# Patient Record
Sex: FEMALE | Race: ASIAN | Employment: STUDENT | URBAN - METROPOLITAN AREA
[De-identification: names, ages, dates, MRNs, and addresses within clinical notes are randomized per-mention and may not be internally consistent; named-entity substitution may affect disease eponyms.]

---
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
## 2019-05-15 NOTE — TELEPHONE ENCOUNTER
Called [facility] to jake back to see if they can use [facility] lab I put this in as preferred lab but need to make sure

## 2019-05-15 NOTE — TELEPHONE ENCOUNTER
Talked to mother gave her information on labs and need to fast then she asked to notify daughter so I called her and left message in detail on labs and need to fast.

## 2019-05-24 NOTE — PROGRESS NOTES
:HPI:   Ya Holloway is a 22year old female who presents for a complete physical exam.     Patient complains of lump on right wrist.  Not painful. Mom concerned about it. Patient thinks it was noticeable after she lost a lot of weight.     Wt Readings f 56   Temp 98.5 °F (36.9 °C) (Oral)   Resp 16   Ht 64.5\"   Wt 140 lb   LMP 04/24/2019   BMI 23.66 kg/m²   Body mass index is 23.66 kg/m². GENERAL: well developed, well nourished,in no apparent distress.   Mood, affect and behavior is normal.    SKIN: no r 05/17/2019 09:02 AM    LDL 76 05/17/2019 09:02 AM    NONHDLC 87 05/17/2019 09:02 AM          Thyroid  (most recent labs)   Lab Results   Component Value Date/Time    T4F 1.1 01/19/2015 12:12 PM         Lipids  (most recent labs)   No results found for: A1C

## 2019-06-28 NOTE — ED PROVIDER NOTES
Patient Seen in: [address] [facility]    History   Patient presents with:  Cough/URI    Stated Complaint: sore throat nasal congestion x 3 days     HPI    22year old female presents for sore throat, sinus congestion and cough.  States sym Pulmonary/Chest: Effort normal and breath sounds normal.   Abdominal: Soft. She exhibits no distension. There is no tenderness. Neurological: She is alert and oriented to person, place, and time. Skin: Skin is warm and dry.    Psychiatric: She has a n

## 2019-12-20 NOTE — PROGRESS NOTES
Patient presents with:  Cough: last week thursday: vitamin c   Runny Nose: last week thursday       HISTORY OF PRESENT ILLNESS  Dyan La is a 20year old female who presents for evaluation of nasal congestion and cough.  For the last 8 days, she has be persistent or worsening symptoms, fevers, chills, shortness of breath. Patient expresses understanding and agreement with above plan.   Robyn Clark PA-C

## 2019-12-27 NOTE — PATIENT INSTRUCTIONS
Gargle with warm salt water solution 3-5 times daily. Dissolve 1/2 teaspoon salt in half cup of warm tap water. Gargle and spit.      Try a premixed saline nasal spray, available over the counter, such as Hamblen Nasal Spray (or generic equi

## 2019-12-27 NOTE — PROGRESS NOTES
Patient presents with:  Cough: same symptoms she had on 12/20 nothing has changed       HPI:  Presents with 15 day history of cough with production of yellow/green colored sputum, sinus congestion, nasal drainage and very mild fatigue.  Denies fever/chills, Do routine nasal saline sinus rinses, warm salt water gargles, as per patient instructions, along with supportive care-increased fluids/rest. Instructed to notify office if not improved in 3-4 days or if symptoms worsen.  Verbalized understanding of instruc

## 2020-04-02 ENCOUNTER — TELEPHONE (OUTPATIENT)
Dept: FAMILY MEDICINE CLINIC | Facility: CLINIC | Age: 20
End: 2020-04-02

## 2020-04-02 NOTE — TELEPHONE ENCOUNTER
Patient called requesting an appointment with Andres Hernández, she needs a medical clearence form completed for her employer.

## 2020-04-02 NOTE — TELEPHONE ENCOUNTER
Unfortunately she has not been seen for a physical since May 2019 and will need updated physical. Can we/ have patient call [employer] and figure out when they are trying to get the fitness testing going? Just given pandemic I would assume that they are in same boat with limiting what is being done. Would prefer to not schedule physical until May and we can sign off on form then.      Thanks,  Aster Ball

## 2020-04-02 NOTE — TELEPHONE ENCOUNTER
Please find out what information is needed on the form. We are not scheduling physicals. Employers should be understanding of this.

## 2020-04-02 NOTE — TELEPHONE ENCOUNTER
Called patient and she will fax the form to us, It just has a check off that she is able to do the physical fitness course conducted by the [employer]

## 2020-04-02 NOTE — TELEPHONE ENCOUNTER
Has form that she needs medical clearance form for [employer] filled out needs to be done as soon as possible

## 2021-06-10 NOTE — TELEPHONE ENCOUNTER
Pt asking if she can get wart burned of instead of using medication - would ins cover it and what is recovery time

## 2021-06-10 NOTE — TELEPHONE ENCOUNTER
I do not have a Hyfrecator in [place] so therefore the patient should be scheduled at the [address] [place] [address] office for this about 1/2-hour procedure thank you

## 2021-06-10 NOTE — TELEPHONE ENCOUNTER
Dr. Phillip Lacks please see below. Patient was seen today in the clinic. Calling back to inquire about other treatment options. Would this be possible for her? If so can you please advise on procedure details? Thank you.

## 2021-06-10 NOTE — PROGRESS NOTES
Prescription called into [facility] [address] for pt per verbal order from Dr Annette Holt. Prescription for Wart Peel: 53% Salicylic Acid with 2% 5-fluorouricel. One tube, one refil. *apply after bathing daily and cover with duct tape*.  Msg left on voice m

## 2021-06-11 NOTE — TELEPHONE ENCOUNTER
Pt is returning the nurses call. Pt. States that she wants to change the dr's treatment plan. Please call to discuss.

## 2021-06-11 NOTE — PROGRESS NOTES
Nino Medina is a 23year old female. Patient presents with: Foot Pain: Right foot pain. Mass on bottom of foot. Onset 1 month ago. Pain with pressure. Pain scale 4-5/10.   Diabetic Ulcer        HPI:   This pleasant patient presents to the clinic she is c visit.  GENERAL: well developed, well nourished, in no apparent distress  EXTREMITIES:   1. Integument: The skin on her right foot was evaluated is warm and moist she has a plantar dermal papillomatous lesion in the vicinity of the second metatarsal head.

## 2021-06-11 NOTE — TELEPHONE ENCOUNTER
I spoke with the patient. Discussed procedure for electrocautery of wart. Answered her questions. She would like to discuss with her parents and will call back to schedule if she wishes to book appt in [place] for procedure.    Advised to discuss cove

## 2021-07-11 NOTE — PROGRESS NOTES
Kailey Washington is a 23year old female. Patient presents with:  Warts: Right foot f/u - states she did not noticed any changes, no pain         HPI:   Patient returns to the clinic she has not noticed any pain or discomfort on the bottom of her right foot. debridement of verruca seems to be resolved at this time   2. Vascular: The patient has palpable pulses no changes here   3. Neurologic: There are no changes still has intact sensorium she does not complain of any numbness or tingling   4.  Musculoskeletal:

## 2023-03-24 NOTE — TELEPHONE ENCOUNTER
Patient calling to transfer OB care due to currently in [facility]/ [employer], moving back to [place] to deliver. GA 16 wks  HILARY Sept 2023  Vanesa ADLERO    Mother called for pt, she will have what records she has already translated, faxed to [facility] office.  Other records are still processing for translating

## 2023-05-19 PROBLEM — H61.23 BILATERAL IMPACTED CERUMEN: Status: RESOLVED | Noted: 2017-08-07 | Resolved: 2023-05-19

## 2023-05-19 PROBLEM — Z34.00 SUPERVISION OF NORMAL FIRST PREGNANCY, ANTEPARTUM: Status: ACTIVE | Noted: 2023-05-19

## 2023-05-19 NOTE — PROGRESS NOTES
AYAZ--24w2d    Doing well. Denies Vb, LOF, contractions. positive fetal movement. A/P:   1. FHT-P  2. PNL: was confused on what needed to be done. Ordered 1 hour with remainder of labs, stressed should have done prior to next visit  3. RH unknown, await labs  4. Reviewed TDAP, she will consider  5. We reviewed options of IOL at 39+ weeks--she will consider. We reviewed logistics and advised if she wants to schedule we should try to do ASAP  6.  Reviewed birth classes, peds picking    Return in 4 weeks

## 2023-05-19 NOTE — PATIENT INSTRUCTIONS
Please call [phone] to set up an appointment for your laboratory draw. SCREENING FOR GESTATIONAL DIABETES     Pregnancy is a stress to your body. One way in which the stress may manifest itself is through an abnormality in sugar metabolism. In non-pregnant patients, this abnormality (a lack of insulin) is known as diabetes. During pregnancy, the insulin normally secreted may become ineffective because of the various hormones and enzymes produced by the placenta. This leads to a condition known as gestational diabetes, Class A diabetes, or pregnancy induced glucose intolerance (PIGI). This condition disappears after delivery. It is known to occur in approximately 5-10% of all pregnancies. It is important because we know that increased maternal blood glucose (sugar) levels can cause fetal problems. That is, the morbidity and mortality rates can be doubled among pregnancies complicated by undiagnosed gestational diabetes. We screen all patients for gestational diabetes at around 24 weeks of pregnancy. The reason we wait until this time is that the stresses of pregnancy leading to diabetes become maximal around this time, therefore, the chance of a false negative test is minimized. If the diabetes is detected at this time, it gives us ample time to correct the glucose metabolism and hopefully, decrease  morbidity and mortality. An order will be placed and the screening will be done in an outpatient lab facility. Please call the lab to schedule an appointment for this testing, the outpatient labs do not take walk-ins due to the length of this test.  On the day of your test, we suggest you eat a normal breakfast, but eliminate pastries/doughnuts the morning of your test.  We also recommend that you do not eat or drink anything with sugar in it 2 hours prior to your test.   Upon arrival at the outpatient laboratory facility, you will be given a 50 gm carbohydrate drink.   A blood glucose level will then be drawn one hour after ingestion. If the blood glucose value is normal and no other risk factors exist, no further testing is necessary. If the blood glucose is abnormal, this indicates an increased risk of gestational diabetes and will require a formal three hour glucose tolerance test.  This test will be done within two weeks following your abnormal one hour screen. Approximately 15% of patients with positive screening tests will have an abnormal three hour test glucose tolerance test.     If you have any questions regarding this testing, please feel free to contact us. If gestational diabetes is detected, it will be thoroughly explained to you and discussion on management will take place with one of our physicians. *If your preferred Lab is THE [facility], you may choose either the [place] location or the [place] location, [address] for your testing. You are also welcome to coordinate your lab appointment with your 24 week obstetric appointment so that you may spend your one hour wait time in our office rather than the lab facility [address] [place] [address] has had significant outbreaks of pertussis (whooping cough) for the last few years. Therefore, the CDC recommends that all pregnant women receive a Tdap vaccine during pregnancy, regardless of whether you have received one previously. The vaccine reduces your chances of marty the illness, and also provides some natural immunity to your baby for possible exposures after birth. The best time to get the vaccine is between 27 and 36 weeks. Baby caregivers (grandparents, spouse) should also receive the vaccine. Influenza- Pregnant women who develop the flu are more prone to serious complications that can affect both mother and baby. The CDC recommends that all women who will be pregnant during flu season (October to May) receive the flu vaccine (injection only, not nasal spray).   The vaccine can be given during any stage of pregnancy. Both vaccines are offered in our office, as well as through many pharmacies and primary care offices. Pregnancy/Nursing and Covid-19 Vaccine   As the [facility] begins to make progress in administering the Covid-19 vaccine, we understand that our pregnant and breastfeeding patients will have questions about the safety of the Covid-19 vaccine. Keep in mind that information is evolving rapidly and that recommendations can change over time. The CDC, the Society for Maternal Fetal Medicine, and the [facility] of Obstetrics & Gynecology now recommend that all pregnant and breastfeeding women consider getting the Covid-19 vaccine, in consultation with their healthcare provider. Factors to consider include the available safety data, risks to pregnant women from Covid-19 infection, and your individual risk for infection and severe disease, based on your risk of exposure and any other medical risk factors that you may have. A recent study of 36,000 pregnant women confirmed the safety and effectiveness of the vaccine, with no increase risk of miscarriage. Here are some facts to consider when you are making a decision:   [1] Pregnant women are at higher risk for acquiring Covid-19 infection and have a subsequent higher risk for the following:  - severe illness  - adverse pregnancy outcomes including  birth  - hospitalization, ICU admission, need for mechanical ventilation and death  [2] The mRNA vaccines that are currently available do not contain live virus, do not enter the nucleus, do not cause genetic changes, do not alter human DNA, and cannot cause Covid-19 illness. [3] mRNA breaks down quickly, so it's unlikely that any of the mRNA in the vaccine would be able to get into your breast milk or into your baby through the placenta.   [4] The antibodies that your body produces in response to the vaccine will be passed to your baby through the placenta and provide some protection for your  baby against Covid-19 infection. [5] Whether you ultimately decide to receive the vaccine or not, do not forget to continue with other preventative measures including frequent hand washing, avoiding crowds, physical distancing and wearing a mask.

## 2023-06-07 PROBLEM — O99.810 ABNORMAL GLUCOSE TOLERANCE TEST IN PREGNANCY, ANTEPARTUM: Status: ACTIVE | Noted: 2023-06-07

## 2023-06-07 NOTE — PROGRESS NOTES
Abnormal 1 hr glucose tolerance test.  Check 3 hr GTT. Order placed. Patient notified via [address]

## 2023-06-17 ENCOUNTER — APPOINTMENT (OUTPATIENT)
Dept: ULTRASOUND IMAGING | Facility: HOSPITAL | Age: 23
End: 2023-06-17
Attending: OBSTETRICS & GYNECOLOGY
Payer: COMMERCIAL

## 2023-06-17 ENCOUNTER — HOSPITAL ENCOUNTER (OUTPATIENT)
Facility: HOSPITAL | Age: 23
Discharge: HOME | End: 2023-06-17
Attending: OBSTETRICS & GYNECOLOGY | Admitting: OBSTETRICS & GYNECOLOGY
Payer: COMMERCIAL

## 2023-06-17 ENCOUNTER — ROUTINE PRENATAL (OUTPATIENT)
Dept: OBGYN CLINIC | Facility: CLINIC | Age: 23
End: 2023-06-17
Payer: COMMERCIAL

## 2023-06-17 VITALS
DIASTOLIC BLOOD PRESSURE: 73 MMHG | BODY MASS INDEX: 31.24 KG/M2 | HEART RATE: 87 BPM | SYSTOLIC BLOOD PRESSURE: 123 MMHG | HEIGHT: 64 IN | WEIGHT: 183 LBS

## 2023-06-17 VITALS
DIASTOLIC BLOOD PRESSURE: 66 MMHG | SYSTOLIC BLOOD PRESSURE: 102 MMHG | BODY MASS INDEX: 31 KG/M2 | WEIGHT: 183.38 LBS | HEART RATE: 79 BPM

## 2023-06-17 DIAGNOSIS — Z23 NEED FOR VACCINATION: ICD-10-CM

## 2023-06-17 DIAGNOSIS — Z34.00 SUPERVISION OF NORMAL FIRST PREGNANCY, ANTEPARTUM: Primary | ICD-10-CM

## 2023-06-17 DIAGNOSIS — O36.8190 DECREASED FETAL MOVEMENT AFFECTING MANAGEMENT OF PREGNANCY, ANTEPARTUM, SINGLE OR UNSPECIFIED FETUS: ICD-10-CM

## 2023-06-17 DIAGNOSIS — Z3A.28 28 WEEKS GESTATION OF PREGNANCY: ICD-10-CM

## 2023-06-17 LAB
GLUCOSE (URINE DIPSTICK): NEGATIVE MG/DL
MULTISTIX LOT#: NORMAL NUMERIC
PROTEIN (URINE DIPSTICK): NEGATIVE MG/DL

## 2023-06-17 PROCEDURE — 90471 IMMUNIZATION ADMIN: CPT | Performed by: OBSTETRICS & GYNECOLOGY

## 2023-06-17 PROCEDURE — 76818 FETAL BIOPHYS PROFILE W/NST: CPT | Performed by: OBSTETRICS & GYNECOLOGY

## 2023-06-17 PROCEDURE — 76819 FETAL BIOPHYS PROFIL W/O NST: CPT | Performed by: OBSTETRICS & GYNECOLOGY

## 2023-06-17 PROCEDURE — 3078F DIAST BP <80 MM HG: CPT | Performed by: OBSTETRICS & GYNECOLOGY

## 2023-06-17 PROCEDURE — 3074F SYST BP LT 130 MM HG: CPT | Performed by: OBSTETRICS & GYNECOLOGY

## 2023-06-17 PROCEDURE — 59025 FETAL NON-STRESS TEST: CPT | Performed by: OBSTETRICS & GYNECOLOGY

## 2023-06-17 PROCEDURE — 81002 URINALYSIS NONAUTO W/O SCOPE: CPT | Performed by: OBSTETRICS & GYNECOLOGY

## 2023-06-17 PROCEDURE — 90715 TDAP VACCINE 7 YRS/> IM: CPT | Performed by: OBSTETRICS & GYNECOLOGY

## 2023-06-17 NOTE — NST
Nonstress Test   Patient: Jeremiah Hudson    Gestation: 28w3d    NST:       Variability: Moderate           Accelerations: Yes           Decelerations: None            Baseline: 140 BPM           Uterine Irritability: No           Contractions: Not present                                        Acoustic Stimulator: No           Nonstress Test Interpretation: Appropriate for gestational age           Nonstress Test Second Interpretation: Appropriate for gestational age                     Additional Comments

## 2023-06-17 NOTE — PROGRESS NOTES
AYAZ    GA: 28w3d   23  1126   BP: 102/66   Pulse: 79   Weight: 183 lb 6 oz (83.2 kg)       Doing well, +FM but decreased. The patient reports that she has noted less kicks but slight fluttering of movement since she completed her 3-hour glucose tolerance test.  Denies LOF/VB/uctx  Rh +, TDAP received, EPDS 0  PTL and Fetal movement instructions given  Decreased fetal movement, NST in office noted for moderate ability but nonreactive. Sent to labor and delivery for further evaluation including BPP with prolonged monitoring. Triage RN notified. On-call physician notified. Precautions provided. Of note, patient's partner is in the [employer]. Therefore, patient desires to be scheduled for induction of labor. Patient desires to be scheduled on 2023 for cervical ripening. Request sent. Problem List Items Addressed This Visit        Gravid and     Supervision of normal first pregnancy, antepartum - Primary   Other Visit Diagnoses     28 weeks gestation of pregnancy        Relevant Orders    URINALYSIS NONAUTO W/O SCOPE (OB URISTIX) (Completed)    Need for vaccination        Relevant Orders    IMMUNIZATION ADMINISTRATION    TETANUS, DIPHTHERIA TOXOIDS AND ACELLULAR PERTUSIS VACCINE (TDAP), >7 YEARS, IM USE (Completed)    Decreased fetal movement affecting management of pregnancy, antepartum, single or unspecified fetus        Relevant Orders    FETAL NON-STRESS TEST EMG ONLY [address]          RTC in 2 wks      Note to patient and family   The [facility] makes medical notes available to patients in the interest of transparency. However, please be advised that this is a medical document. It is intended as jdov-li-xckc communication. It is written and medical language may contain abbreviations or verbiage that are technical and unfamiliar. It may appear blunt or direct.   Medical documents are intended to carry relevant information, facts as evident, and the clinical opinion of the practitioner.              '

## 2023-06-17 NOTE — PROGRESS NOTES
Pt is a 20year old female admitted to TRG2/TRG2-A. Pt is  28w3d intra-uterine pregnancy. Patient presents with:  Decreased Fetal Movement  Pt sent from OB office d/t decreased fetal movement and NR NST. Pt denies any complications w/ current pregnancy or any chronic medical hx. Pt denies any leaking of fluid, vaginal bleeding, or uterine activity. History obtained, oriented to room, staff, and plan of care.

## 2023-06-18 PROCEDURE — 59025 FETAL NON-STRESS TEST: CPT | Performed by: OBSTETRICS & GYNECOLOGY

## 2023-06-18 NOTE — PROGRESS NOTES
FETAL NONSTRESS TEST:  Baseline FHR: 130 per minute  Fetal heart variability: moderate  Fetal Heart Rate accelerations: 15x15   Fetal Heart Rate decelerations: one possible variable deceleration  Tracing category 1  Fetal Non-stress Test Interpretation: reactive    Colette Levy MD  [address] [facility]

## 2023-06-19 ENCOUNTER — TELEPHONE (OUTPATIENT)
Dept: OBGYN CLINIC | Facility: CLINIC | Age: 23
End: 2023-06-19

## 2023-07-01 ENCOUNTER — ROUTINE PRENATAL (OUTPATIENT)
Dept: OBGYN CLINIC | Facility: CLINIC | Age: 23
End: 2023-07-01
Payer: COMMERCIAL

## 2023-07-01 VITALS
DIASTOLIC BLOOD PRESSURE: 72 MMHG | HEART RATE: 73 BPM | SYSTOLIC BLOOD PRESSURE: 104 MMHG | BODY MASS INDEX: 32 KG/M2 | WEIGHT: 184.5 LBS

## 2023-07-01 DIAGNOSIS — Z34.00 SUPERVISION OF NORMAL FIRST PREGNANCY, ANTEPARTUM: Primary | ICD-10-CM

## 2023-07-01 LAB
GLUCOSE (URINE DIPSTICK): NEGATIVE MG/DL
MULTISTIX LOT#: ABNORMAL NUMERIC

## 2023-07-01 PROCEDURE — 81002 URINALYSIS NONAUTO W/O SCOPE: CPT | Performed by: NURSE PRACTITIONER

## 2023-07-01 PROCEDURE — 3074F SYST BP LT 130 MM HG: CPT | Performed by: NURSE PRACTITIONER

## 2023-07-01 PROCEDURE — 3078F DIAST BP <80 MM HG: CPT | Performed by: NURSE PRACTITIONER

## 2023-07-14 ENCOUNTER — ROUTINE PRENATAL (OUTPATIENT)
Dept: OBGYN CLINIC | Facility: CLINIC | Age: 23
End: 2023-07-14
Payer: COMMERCIAL

## 2023-07-14 VITALS
WEIGHT: 187.25 LBS | SYSTOLIC BLOOD PRESSURE: 106 MMHG | DIASTOLIC BLOOD PRESSURE: 70 MMHG | BODY MASS INDEX: 32 KG/M2 | HEART RATE: 110 BPM

## 2023-07-14 DIAGNOSIS — Z3A.32 32 WEEKS GESTATION OF PREGNANCY: Primary | ICD-10-CM

## 2023-07-14 LAB
GLUCOSE (URINE DIPSTICK): NEGATIVE MG/DL
MULTISTIX LOT#: ABNORMAL NUMERIC
PROTEIN (URINE DIPSTICK): 30 MG/DL

## 2023-07-14 PROCEDURE — 3074F SYST BP LT 130 MM HG: CPT | Performed by: OBSTETRICS & GYNECOLOGY

## 2023-07-14 PROCEDURE — 81002 URINALYSIS NONAUTO W/O SCOPE: CPT | Performed by: OBSTETRICS & GYNECOLOGY

## 2023-07-14 PROCEDURE — 3078F DIAST BP <80 MM HG: CPT | Performed by: OBSTETRICS & GYNECOLOGY

## 2023-07-14 NOTE — PROGRESS NOTES
32w2d seen for routine OB visit. Denies ctx, lof, vb. Reports good FM. Patient Active Problem List:     Supervision of normal first pregnancy, antepartum     Abnormal glucose tolerance test in pregnancy, antepartum       TW lb 4 oz (16.9 kg)   Depression Scale Total: 0 (2023  9:11 AM)      Gen: AAOx3, NAD  Resp: breathing comfortably  Abdomen: gravid, soft, nontender  Ext: nontender, no edema    Plan:  - s/p tdap  - 3T HIV neg  - questions about pediatrician, cord blood banking, prenatal massage  - return precautions reviewed    RTO in 2 week(s).

## 2023-07-23 NOTE — TELEPHONE ENCOUNTER
Please enter lab orders for the patient's upcoming physical appointment. Physical scheduled:    Your appointments     Date & Time Appointment Department [facility])    May 24, 2019 10:00 AM CDT Physical - Established Patient with AI Sheridan No

## 2023-07-29 ENCOUNTER — ROUTINE PRENATAL (OUTPATIENT)
Dept: OBGYN CLINIC | Facility: CLINIC | Age: 23
End: 2023-07-29
Payer: COMMERCIAL

## 2023-07-29 VITALS
WEIGHT: 195.19 LBS | HEIGHT: 64 IN | SYSTOLIC BLOOD PRESSURE: 110 MMHG | DIASTOLIC BLOOD PRESSURE: 62 MMHG | BODY MASS INDEX: 33.32 KG/M2

## 2023-07-29 DIAGNOSIS — Z34.00 SUPERVISION OF NORMAL FIRST PREGNANCY, ANTEPARTUM: Primary | ICD-10-CM

## 2023-07-29 DIAGNOSIS — O26.843 UTERINE SIZE-DATE DISCREPANCY IN THIRD TRIMESTER: ICD-10-CM

## 2023-07-29 DIAGNOSIS — O26.00 EXCESSIVE WEIGHT GAIN AFFECTING PREGNANCY: ICD-10-CM

## 2023-07-29 PROBLEM — O26.849 UTERINE SIZE DATE DISCREPANCY: Status: ACTIVE | Noted: 2023-07-29

## 2023-07-29 PROBLEM — O36.8130 DECREASED FETAL MOVEMENTS IN THIRD TRIMESTER: Status: ACTIVE | Noted: 2023-07-29

## 2023-07-29 LAB
GLUCOSE (URINE DIPSTICK): NEGATIVE MG/DL
MULTISTIX LOT#: NORMAL NUMERIC

## 2023-07-29 PROCEDURE — 81002 URINALYSIS NONAUTO W/O SCOPE: CPT | Performed by: STUDENT IN AN ORGANIZED HEALTH CARE EDUCATION/TRAINING PROGRAM

## 2023-07-29 PROCEDURE — 3008F BODY MASS INDEX DOCD: CPT | Performed by: STUDENT IN AN ORGANIZED HEALTH CARE EDUCATION/TRAINING PROGRAM

## 2023-07-29 PROCEDURE — 3078F DIAST BP <80 MM HG: CPT | Performed by: STUDENT IN AN ORGANIZED HEALTH CARE EDUCATION/TRAINING PROGRAM

## 2023-07-29 PROCEDURE — 3074F SYST BP LT 130 MM HG: CPT | Performed by: STUDENT IN AN ORGANIZED HEALTH CARE EDUCATION/TRAINING PROGRAM

## 2023-07-29 NOTE — PROGRESS NOTES
Return OB Visit 28-33 WGA      GA: 34w3d   23  1133   BP: 110/62   Weight: 195 lb 3.2 oz (88.5 kg)   Height: 64\"       Doing well. Pt reports minimal fetal movement. She will feel baby move once or twice a day. She \"does not do kick counts because baby never moves enough\". Denies LOF/VB/uctx  Rh positive, TDAP 2023 received, EPDS Depression Scale Total: 0 (2023  9:11 AM)   PTL and Fetal movement instructions given  3rd T HIV NR      Patient Active Problem List    Decreased fetal movements in third trimester            NSTs weekly unless resolves      Excessive weight gain affecting pregnancy      Uterine size date discrepancy            Growth ultrasound ordered       Abnormal glucose tolerance test in pregnancy, antepartum            [ x ] check 3 hr GTT- Nl 0/4      Supervision of normal first pregnancy, antepartum        RTC in 2 wks      Note to patient and family   The [facility] makes medical notes available to patients in the interest of transparency. However, please be advised that this is a medical document. It is intended as gjqy-pr-turb communication. It is written and medical language may contain abbreviations or verbiage that are technical and unfamiliar. It may appear blunt or direct. Medical documents are intended to carry relevant information, facts as evident, and the clinical opinion of the practitioner.       Erika Chapin MD

## 2023-07-31 ENCOUNTER — ROUTINE PRENATAL (OUTPATIENT)
Dept: OBGYN CLINIC | Facility: CLINIC | Age: 23
End: 2023-07-31
Payer: COMMERCIAL

## 2023-07-31 ENCOUNTER — APPOINTMENT (OUTPATIENT)
Dept: OBGYN CLINIC | Facility: CLINIC | Age: 23
End: 2023-07-31
Payer: COMMERCIAL

## 2023-07-31 ENCOUNTER — TELEPHONE (OUTPATIENT)
Dept: OBGYN CLINIC | Facility: CLINIC | Age: 23
End: 2023-07-31

## 2023-07-31 VITALS
SYSTOLIC BLOOD PRESSURE: 110 MMHG | DIASTOLIC BLOOD PRESSURE: 72 MMHG | WEIGHT: 195.25 LBS | HEART RATE: 106 BPM | BODY MASS INDEX: 34 KG/M2

## 2023-07-31 DIAGNOSIS — O36.8130 DECREASED FETAL MOVEMENTS IN THIRD TRIMESTER, SINGLE OR UNSPECIFIED FETUS: ICD-10-CM

## 2023-07-31 DIAGNOSIS — Z34.00 SUPERVISION OF NORMAL FIRST PREGNANCY, ANTEPARTUM: ICD-10-CM

## 2023-07-31 DIAGNOSIS — Z3A.34 34 WEEKS GESTATION OF PREGNANCY: Primary | ICD-10-CM

## 2023-07-31 LAB
GLUCOSE (URINE DIPSTICK): NEGATIVE MG/DL
MULTISTIX LOT#: NORMAL NUMERIC

## 2023-07-31 PROCEDURE — 3074F SYST BP LT 130 MM HG: CPT | Performed by: NURSE PRACTITIONER

## 2023-07-31 PROCEDURE — 81002 URINALYSIS NONAUTO W/O SCOPE: CPT | Performed by: NURSE PRACTITIONER

## 2023-07-31 PROCEDURE — 3078F DIAST BP <80 MM HG: CPT | Performed by: NURSE PRACTITIONER

## 2023-07-31 PROCEDURE — 59025 FETAL NON-STRESS TEST: CPT | Performed by: NURSE PRACTITIONER

## 2023-07-31 NOTE — PROGRESS NOTES
AYAZ  Doing well, no concerns  GOOD FM  Denies VB/LOF/uctx  RTC in 1 week with NST for chronic decreased fetal movement  NST was reactive  Fetal movement discussed

## 2023-07-31 NOTE — TELEPHONE ENCOUNTER
----- Message from Erika Chapin MD sent at 7/29/2023  1:06 PM CDT -----  Hi,     I am concerned about this patient because she says her baby only moves once or twice a day. She is 34w3d. I discussed weekly nsts. However her first is not until 8/9. Can we fit her in next week?     Thanks,     Erika Chapin MD

## 2023-08-01 ENCOUNTER — HOSPITAL ENCOUNTER (OUTPATIENT)
Dept: ULTRASOUND IMAGING | Facility: HOSPITAL | Age: 23
Discharge: HOME | End: 2023-08-01
Attending: STUDENT IN AN ORGANIZED HEALTH CARE EDUCATION/TRAINING PROGRAM
Payer: COMMERCIAL

## 2023-08-01 DIAGNOSIS — O26.00 EXCESSIVE WEIGHT GAIN AFFECTING PREGNANCY: ICD-10-CM

## 2023-08-01 DIAGNOSIS — O26.843 UTERINE SIZE-DATE DISCREPANCY IN THIRD TRIMESTER: ICD-10-CM

## 2023-08-01 DIAGNOSIS — Z34.00 SUPERVISION OF NORMAL FIRST PREGNANCY, ANTEPARTUM: ICD-10-CM

## 2023-08-01 PROCEDURE — 76816 OB US FOLLOW-UP PER FETUS: CPT | Performed by: STUDENT IN AN ORGANIZED HEALTH CARE EDUCATION/TRAINING PROGRAM

## 2023-08-01 PROCEDURE — 76819 FETAL BIOPHYS PROFIL W/O NST: CPT | Performed by: STUDENT IN AN ORGANIZED HEALTH CARE EDUCATION/TRAINING PROGRAM

## 2023-08-09 ENCOUNTER — APPOINTMENT (OUTPATIENT)
Dept: OBGYN CLINIC | Facility: CLINIC | Age: 23
End: 2023-08-09
Payer: COMMERCIAL

## 2023-08-09 ENCOUNTER — ROUTINE PRENATAL (OUTPATIENT)
Dept: OBGYN CLINIC | Facility: CLINIC | Age: 23
End: 2023-08-09
Payer: COMMERCIAL

## 2023-08-09 VITALS
SYSTOLIC BLOOD PRESSURE: 110 MMHG | BODY MASS INDEX: 34 KG/M2 | HEART RATE: 74 BPM | DIASTOLIC BLOOD PRESSURE: 78 MMHG | WEIGHT: 199 LBS

## 2023-08-09 DIAGNOSIS — Z34.93 PRENATAL CARE IN THIRD TRIMESTER: ICD-10-CM

## 2023-08-09 DIAGNOSIS — Z36.9 ANTENATAL SCREENING ENCOUNTER: ICD-10-CM

## 2023-08-09 DIAGNOSIS — O36.8190 DECREASED FETAL MOVEMENT AFFECTING MANAGEMENT OF PREGNANCY, ANTEPARTUM, SINGLE OR UNSPECIFIED FETUS: Primary | ICD-10-CM

## 2023-08-09 PROCEDURE — 87653 STREP B DNA AMP PROBE: CPT | Performed by: OBSTETRICS & GYNECOLOGY

## 2023-08-09 PROCEDURE — 87081 CULTURE SCREEN ONLY: CPT | Performed by: OBSTETRICS & GYNECOLOGY

## 2023-08-09 PROCEDURE — 3078F DIAST BP <80 MM HG: CPT | Performed by: OBSTETRICS & GYNECOLOGY

## 2023-08-09 PROCEDURE — 59025 FETAL NON-STRESS TEST: CPT | Performed by: OBSTETRICS & GYNECOLOGY

## 2023-08-09 PROCEDURE — 3074F SYST BP LT 130 MM HG: CPT | Performed by: OBSTETRICS & GYNECOLOGY

## 2023-08-09 NOTE — PROGRESS NOTES
Patient presents with:  Prenatal Care: NST/AYAZ    Routine prenatal visit without complaints. Patient denies any bleeding, leaking fluid, cramping, or regular uterine contractions. Good fetal movement. SVE: cl/th/-3 vertex  NST: see report. Reactive  Assessment/Plan:  36w0d doing well  GBS done  Weekly NST for dec.   Kick counts reviewed. Reviewed labor signs and symptoms. Diagnoses and all orders for this visit:    Decreased fetal movement affecting management of pregnancy, antepartum, single or unspecified fetus  -     FETAL NON-STRESS TEST EMG ONLY [address];  Future     screening encounter  -     STREP B CULTURE; Future    Prenatal care in third trimester       Return in about 1 week (around 2023) for Routine Prenatal Visit, NST.

## 2023-08-10 ENCOUNTER — PATIENT MESSAGE (OUTPATIENT)
Dept: FAMILY MEDICINE CLINIC | Facility: CLINIC | Age: 23
End: 2023-08-10

## 2023-08-10 NOTE — TELEPHONE ENCOUNTER
From: Giuseppe Palm  To: Chet Mahan MD  Sent: 8/10/2023 3:20 PM CDT  Subject: Baby HILARY 9/6/23    Hello,    I am expecting my first baby to be born on 9/6/23 and am looking for a family doctor. Are you currently accepting newborns? If yes, what is the process that I need to do? Also, are you an in-network provider for [facility]? I saw that it was accepted but not whether or not it's in or out of network. I am only going to be in [facility] until the middle of October since [place] be moving down to [place] to join the  at [facility].     Thank you,  Lien Maher

## 2023-08-11 ENCOUNTER — TELEPHONE (OUTPATIENT)
Dept: OBGYN CLINIC | Facility: CLINIC | Age: 23
End: 2023-08-11

## 2023-08-11 LAB — GROUP B STREP BY PCR FOR PCR OVT: NEGATIVE

## 2023-08-11 NOTE — TELEPHONE ENCOUNTER
Patient states that she is having decreased fetal movement/low kick counts. She states that dr is aware but she feels that there is not enough being done.   Please call to advise

## 2023-08-11 NOTE — TELEPHONE ENCOUNTER
Patient refuses to go to L&D. I advised her to go per nurse recommendations. She feels that they will not do anything different then has already been done.

## 2023-08-11 NOTE — TELEPHONE ENCOUNTER
Contacted patient by phone. She reports decreased fetal movement. States it has been ongoing in her pregnancy and she is very concern. Wants to know what is going to be done about it. Advised that she needs to be seen in labor and delivery if she is not having baby movement or decreased baby movement. Patient states understanding. Labor and delivery notified.

## 2023-08-11 NOTE — TELEPHONE ENCOUNTER
Spoke to patient and states she was just calling to notify us that she is not going to L&D. States that the decreased fetal movement has been common this pregnancy and nothing has changed since she was last seen in the office 2 days ago. Discussed that this may be her normal and that if she notices more decreased fetal movement \"than her normal\" or leakage of fluid that she would have to proceed to L&D. Precautions reviewed and patient verbalized understanding. No further questions.

## 2023-08-14 NOTE — TELEPHONE ENCOUNTER
Call pt and give some information for new coming baby on 09/06/23 . Pt mom will call back when the baby born with more information. Pt mom is aware the insurance we take is   [employer].

## 2023-08-18 ENCOUNTER — ROUTINE PRENATAL (OUTPATIENT)
Dept: OBGYN CLINIC | Facility: CLINIC | Age: 23
End: 2023-08-18
Payer: COMMERCIAL

## 2023-08-18 ENCOUNTER — APPOINTMENT (OUTPATIENT)
Dept: OBGYN CLINIC | Facility: CLINIC | Age: 23
End: 2023-08-18
Payer: COMMERCIAL

## 2023-08-18 VITALS
HEART RATE: 98 BPM | DIASTOLIC BLOOD PRESSURE: 70 MMHG | BODY MASS INDEX: 35 KG/M2 | SYSTOLIC BLOOD PRESSURE: 110 MMHG | WEIGHT: 204.13 LBS

## 2023-08-18 DIAGNOSIS — O36.8190 DECREASED FETAL MOVEMENT AFFECTING MANAGEMENT OF PREGNANCY, ANTEPARTUM, SINGLE OR UNSPECIFIED FETUS: Primary | ICD-10-CM

## 2023-08-18 PROCEDURE — 3074F SYST BP LT 130 MM HG: CPT | Performed by: NURSE PRACTITIONER

## 2023-08-18 PROCEDURE — 59025 FETAL NON-STRESS TEST: CPT | Performed by: NURSE PRACTITIONER

## 2023-08-18 PROCEDURE — 3078F DIAST BP <80 MM HG: CPT | Performed by: NURSE PRACTITIONER

## 2023-08-18 NOTE — PROGRESS NOTES
AYAZ 37w2d    Doing well, +FM  Denies contractions  Denies LOF, VB      FHT-NST reactive  PNL:  GBS negative  Mode of delivery: anticipate , IOL scheduled   Immunizations: s/p TDAP  Decreased fetal movement throughout pregnancy: weekly NST  Labor precautions reviewed  Fetal movement expectations discussed    Return in 1 weeks

## 2023-08-23 ENCOUNTER — ROUTINE PRENATAL (OUTPATIENT)
Dept: OBGYN CLINIC | Facility: CLINIC | Age: 23
End: 2023-08-23
Payer: COMMERCIAL

## 2023-08-23 ENCOUNTER — APPOINTMENT (OUTPATIENT)
Dept: OBGYN CLINIC | Facility: CLINIC | Age: 23
End: 2023-08-23
Payer: COMMERCIAL

## 2023-08-23 VITALS
HEART RATE: 91 BPM | DIASTOLIC BLOOD PRESSURE: 72 MMHG | SYSTOLIC BLOOD PRESSURE: 108 MMHG | BODY MASS INDEX: 35 KG/M2 | WEIGHT: 205 LBS

## 2023-08-23 DIAGNOSIS — O36.8130 DECREASED FETAL MOVEMENTS IN THIRD TRIMESTER, SINGLE OR UNSPECIFIED FETUS: Primary | ICD-10-CM

## 2023-08-23 PROCEDURE — 3078F DIAST BP <80 MM HG: CPT | Performed by: OBSTETRICS & GYNECOLOGY

## 2023-08-23 PROCEDURE — 59025 FETAL NON-STRESS TEST: CPT | Performed by: OBSTETRICS & GYNECOLOGY

## 2023-08-23 PROCEDURE — 3074F SYST BP LT 130 MM HG: CPT | Performed by: OBSTETRICS & GYNECOLOGY

## 2023-08-23 NOTE — PROGRESS NOTES
38w0d seen for routine OB visit. Denies ctx, lof, vb. Reports + FM - still decreased from what she thinks should be normal.    Patient Active Problem List:     Supervision of normal first pregnancy, antepartum     Abnormal glucose tolerance test in pregnancy, antepartum     Decreased fetal movements in third trimester     Excessive weight gain affecting pregnancy     Uterine size date discrepancy       TW lb (24.9 kg)   Depression Scale Total: 0 (2023  8:15 AM)      Gen: AAOx3, NAD  Resp: breathing comfortably  Abdomen: gravid, soft, nontender  Ext: nontender, no edema  SVE: /-2    NST - baseline 135, moderate variability, +15x15 accels, no decels  [place] - irregular    Plan:  - reactive NST, continue weekly  - GBS neg  - IOL scheduled   - return precautions reviewed    RTO in 1 week(s).

## 2023-08-24 ENCOUNTER — TELEPHONE (OUTPATIENT)
Dept: OBGYN CLINIC | Facility: CLINIC | Age: 23
End: 2023-08-24

## 2023-08-24 NOTE — TELEPHONE ENCOUNTER
38w1d  Patient calling with concern of pinkish/brownish discharge last night and this morning- thinks it may be mucus plug. Patient denies regular contractions, bright red bleeding, leaking of fluid or decreased fetal movements. Patient did have a cervical check at appointment yesterday. Discussed that the cervix is very vascular in pregnancy and some spotting is normal after it has been irritated. Informed patient no concern with loss of mucus plug. Instructed patient to call with any bright red bleeding or other concerns. Patient verbalized understanding.

## 2023-08-25 ENCOUNTER — HOSPITAL ENCOUNTER (OUTPATIENT)
Facility: HOSPITAL | Age: 23
Discharge: HOME | End: 2023-08-25
Attending: OBSTETRICS & GYNECOLOGY | Admitting: OBSTETRICS & GYNECOLOGY
Payer: COMMERCIAL

## 2023-08-25 VITALS
SYSTOLIC BLOOD PRESSURE: 130 MMHG | BODY MASS INDEX: 35 KG/M2 | HEIGHT: 64 IN | HEART RATE: 74 BPM | WEIGHT: 205 LBS | DIASTOLIC BLOOD PRESSURE: 86 MMHG | TEMPERATURE: 99 F

## 2023-08-25 PROBLEM — Z3A.38 38 WEEKS GESTATION OF PREGNANCY: Status: ACTIVE | Noted: 2023-08-25

## 2023-08-25 PROBLEM — O46.93 VAGINAL BLEEDING IN PREGNANCY, THIRD TRIMESTER: Status: ACTIVE | Noted: 2023-08-25

## 2023-08-25 PROCEDURE — 59025 FETAL NON-STRESS TEST: CPT | Performed by: OBSTETRICS & GYNECOLOGY

## 2023-08-25 NOTE — PROGRESS NOTES
Discharge instructions reviewed with pt. Pt verbalizes understanding, denies questions. Pt leaves in stable condition per ambulation.

## 2023-08-25 NOTE — PROGRESS NOTES
Dr Gabriela Whitlock on unit. This RN informs her pt , 38+2. Pt c/o VB. Bleeding appears to be bloody show as it is light red and mucousy. Pt not saturating pads, no large clots. Pt c/o occas cramping. SVE /-2, pt was 1-2 cm on Wed. NST reactive. Orders received at this time.

## 2023-08-25 NOTE — NST
Nonstress Test   Patient: Javi Brar    Gestation: 38w2d    NST:       Variability: Moderate           Accelerations: Yes           Decelerations: None            Baseline: 135 BPM           Uterine Irritability: No           Contractions: Irregular                    Contraction Frequency: 2-4                   Acoustic Stimulator: No           Nonstress Test Interpretation: Reactive           Nonstress Test Second Interpretation: Reactive                     Additional Comments

## 2023-08-25 NOTE — TELEPHONE ENCOUNTER
G1/P 0 GA 38 2/7 wks patient complaining of bright red bleeding that has increased from pink spotting noted below. States it is enough to go onto a pad but is not filling a pad. Report cramping but does not believe she is having regular contractions. Also having mucous discharge. Last OV: 8/23/23 alireza with Dr. Angel Valencia   Pregnancy Complications: decreased fetal movement  Abdominal pain: cramping  Leaking of fluid: denies  Vaginal Bleeding: bloody show? Fetal Movement: decreased, but pt states she hasn't felt the baby move her entire pregnancy  Recommendations: advised to go to labor and delivery. Patient states understanding. Labor and delivery notified.

## 2023-08-25 NOTE — PROGRESS NOTES
This RN in room to discuss POC with pt. Pt given option to stay or to go home until labor progress. Pt states she would like to go home. Labor precautions reviewed. EFMs removed,  FHTs 130. Pt changing at this time.

## 2023-08-25 NOTE — PROGRESS NOTES
EFMs applied, FHTs 145. Pt states she had an OB visit on Wed and has had bleeding since. Pt states OB checked her cervix that day. Pt states she had some brown bleeding on Wednesday after her apt that changed to pink/light red bleeding by that night. Pt states she lost her mucous plug yesterday and continues to have light red, mucousy bleedng. Pt denies heavy bright red bleeding or saturating pads. Pt show this RN picture of peripad and toilet paper with amt of bleeding. Pt reports cramping since 0445 today. Pt states cramping was every 10 min, but is now every 30 min. Pt denies LOF. +FM. Pt denies any problems with pregnancy. Pt denies any medical problems. Admission assessment initiated at this time.

## 2023-08-25 NOTE — TELEPHONE ENCOUNTER
Patient calling this morning stating the bleeding has not stopped. She is not wearing a pad. It is bright red and jello like substance. She has been cramping on and off. Nothing is consistent. She would just like a call from the nurse to see if she needed to be checked today or not.  Please advise

## 2023-08-25 NOTE — PROGRESS NOTES
, 38+2 arrives per ambulation. Pt c/o vaginal bleeding. Pt taken to Triage 1 and changing at this time.

## 2023-08-27 ENCOUNTER — HOSPITAL ENCOUNTER (OUTPATIENT)
Facility: HOSPITAL | Age: 23
Discharge: HOME | End: 2023-08-27
Attending: STUDENT IN AN ORGANIZED HEALTH CARE EDUCATION/TRAINING PROGRAM | Admitting: STUDENT IN AN ORGANIZED HEALTH CARE EDUCATION/TRAINING PROGRAM
Payer: COMMERCIAL

## 2023-08-27 ENCOUNTER — HOSPITAL ENCOUNTER (INPATIENT)
Facility: HOSPITAL | Age: 23
LOS: 1 days | Discharge: HOME | End: 2023-08-27
Attending: STUDENT IN AN ORGANIZED HEALTH CARE EDUCATION/TRAINING PROGRAM | Admitting: STUDENT IN AN ORGANIZED HEALTH CARE EDUCATION/TRAINING PROGRAM
Payer: COMMERCIAL

## 2023-08-27 VITALS
HEART RATE: 86 BPM | TEMPERATURE: 98 F | WEIGHT: 205 LBS | HEIGHT: 64 IN | BODY MASS INDEX: 35 KG/M2 | RESPIRATION RATE: 18 BRPM | SYSTOLIC BLOOD PRESSURE: 138 MMHG | DIASTOLIC BLOOD PRESSURE: 95 MMHG

## 2023-08-27 PROBLEM — O16.3 ELEVATED BLOOD PRESSURE AFFECTING PREGNANCY IN THIRD TRIMESTER, ANTEPARTUM: Status: ACTIVE | Noted: 2023-08-27

## 2023-08-27 PROBLEM — O47.9 IRREGULAR UTERINE CONTRACTIONS: Status: ACTIVE | Noted: 2023-08-27

## 2023-08-27 PROBLEM — Z34.90 PREGNANCY: Status: ACTIVE | Noted: 2023-08-27

## 2023-08-27 LAB
ANTIBODY SCREEN: NEGATIVE
BASOPHILS # BLD AUTO: 0.03 X10(3) UL (ref 0–0.2)
BASOPHILS NFR BLD AUTO: 0.1 %
EOSINOPHIL # BLD AUTO: 0.02 X10(3) UL (ref 0–0.7)
EOSINOPHIL NFR BLD AUTO: 0.1 %
ERYTHROCYTE [DISTWIDTH] IN BLOOD BY AUTOMATED COUNT: 12.5 %
HCT VFR BLD AUTO: 38.9 %
HGB BLD-MCNC: 13 G/DL
IMM GRANULOCYTES # BLD AUTO: 0.11 X10(3) UL (ref 0–1)
IMM GRANULOCYTES NFR BLD: 0.5 %
LYMPHOCYTES # BLD AUTO: 1.73 X10(3) UL (ref 1–4)
LYMPHOCYTES NFR BLD AUTO: 7.9 %
MCH RBC QN AUTO: 30.2 PG (ref 26–34)
MCHC RBC AUTO-ENTMCNC: 33.4 G/DL (ref 31–37)
MCV RBC AUTO: 90.3 FL
MONOCYTES # BLD AUTO: 1.16 X10(3) UL (ref 0.1–1)
MONOCYTES NFR BLD AUTO: 5.3 %
NEUTROPHILS # BLD AUTO: 18.75 X10 (3) UL (ref 1.5–7.7)
NEUTROPHILS # BLD AUTO: 18.75 X10(3) UL (ref 1.5–7.7)
NEUTROPHILS NFR BLD AUTO: 86.1 %
PLATELET # BLD AUTO: 191 10(3)UL (ref 150–450)
RBC # BLD AUTO: 4.31 X10(6)UL
RH BLOOD TYPE: POSITIVE
T PALLIDUM AB SER QL IA: NONREACTIVE
WBC # BLD AUTO: 21.8 X10(3) UL (ref 4–11)

## 2023-08-27 PROCEDURE — 99234 HOSP IP/OBS SM DT SF/LOW 45: CPT | Performed by: STUDENT IN AN ORGANIZED HEALTH CARE EDUCATION/TRAINING PROGRAM

## 2023-08-27 RX ORDER — ACETAMINOPHEN 500 MG
500 TABLET ORAL EVERY 6 HOURS PRN
Status: DISCONTINUED | OUTPATIENT
Start: 2023-08-27 | End: 2023-08-27

## 2023-08-27 RX ORDER — IBUPROFEN 600 MG/1
600 TABLET ORAL ONCE AS NEEDED
Status: DISCONTINUED | OUTPATIENT
Start: 2023-08-27 | End: 2023-08-27

## 2023-08-27 RX ORDER — ACETAMINOPHEN 500 MG
1000 TABLET ORAL EVERY 6 HOURS PRN
Status: DISCONTINUED | OUTPATIENT
Start: 2023-08-27 | End: 2023-08-27

## 2023-08-27 RX ORDER — TERBUTALINE SULFATE 1 MG/ML
0.25 INJECTION, SOLUTION SUBCUTANEOUS AS NEEDED
Status: DISCONTINUED | OUTPATIENT
Start: 2023-08-27 | End: 2023-08-27

## 2023-08-27 RX ORDER — CITRIC ACID/SODIUM CITRATE 334-500MG
30 SOLUTION, ORAL ORAL AS NEEDED
Status: DISCONTINUED | OUTPATIENT
Start: 2023-08-27 | End: 2023-08-27

## 2023-08-27 RX ORDER — DEXTROSE, SODIUM CHLORIDE, SODIUM LACTATE, POTASSIUM CHLORIDE, AND CALCIUM CHLORIDE 5; .6; .31; .03; .02 G/100ML; G/100ML; G/100ML; G/100ML; G/100ML
INJECTION, SOLUTION INTRAVENOUS AS NEEDED
Status: DISCONTINUED | OUTPATIENT
Start: 2023-08-27 | End: 2023-08-27

## 2023-08-27 RX ORDER — ONDANSETRON 2 MG/ML
4 INJECTION INTRAMUSCULAR; INTRAVENOUS EVERY 6 HOURS PRN
Status: DISCONTINUED | OUTPATIENT
Start: 2023-08-27 | End: 2023-08-27

## 2023-08-27 RX ORDER — SODIUM CHLORIDE, SODIUM LACTATE, POTASSIUM CHLORIDE, CALCIUM CHLORIDE 600; 310; 30; 20 MG/100ML; MG/100ML; MG/100ML; MG/100ML
INJECTION, SOLUTION INTRAVENOUS CONTINUOUS
Status: DISCONTINUED | OUTPATIENT
Start: 2023-08-27 | End: 2023-08-27

## 2023-08-27 NOTE — PLAN OF CARE
Problem: BIRTH - VAGINAL/ SECTION  Goal: Fetal and maternal status remain reassuring during the birth process  Description: INTERVENTIONS:  - Monitor vital signs  - Monitor fetal heart rate  - Monitor uterine activity  - Monitor labor progression (vaginal delivery)  - DVT prophylaxis (C/S delivery)  - Surgical antibiotic prophylaxis (C/S delivery)  Outcome: Completed     Problem: PAIN - ADULT  Goal: Verbalizes/displays adequate comfort level or patient's stated pain goal  Description: INTERVENTIONS:  - Encourage pt to monitor pain and request assistance  - Assess pain using appropriate pain scale  - Administer analgesics based on type and severity of pain and evaluate response  - Implement non-pharmacological measures as appropriate and evaluate response  - Consider cultural and social influences on pain and pain management  - Manage/alleviate anxiety  - Utilize distraction and/or relaxation techniques  - Monitor for opioid side effects  - Notify MD/LIP if interventions unsuccessful or patient reports new pain  - Anticipate increased pain with activity and pre-medicate as appropriate  Outcome: Completed     Problem: ANXIETY  Goal: Will report anxiety at manageable levels  Description: INTERVENTIONS:  - Administer medication as ordered  - Teach and rehearse alternative coping skills  - Provide emotional support with 1:1 interaction with staff  Outcome: Completed     Problem: Patient/Family Goals  Goal: Patient/Family Long Term Goal  Description: Patient's Long Term Goal: Safe and uncomplicated delivery    Interventions:  - See additional Care Plan goals for specific interventions  Outcome: Completed     Problem: Patient/Family Goals  Goal: Patient/Family Short Term Goal  Description: Patient's Short Term Goal: Adequate pain control    Interventions:   - See additional Care Plan goals for specific interventions  Outcome: Completed

## 2023-08-27 NOTE — PLAN OF CARE
Problem: BIRTH - VAGINAL/ SECTION  Goal: Fetal and maternal status remain reassuring during the birth process  Description: INTERVENTIONS:  - Monitor vital signs  - Monitor fetal heart rate  - Monitor uterine activity  - Monitor labor progression (vaginal delivery)  - DVT prophylaxis (C/S delivery)  - Surgical antibiotic prophylaxis (C/S delivery)  Outcome: Progressing     Problem: PAIN - ADULT  Goal: Verbalizes/displays adequate comfort level or patient's stated pain goal  Description: INTERVENTIONS:  - Encourage pt to monitor pain and request assistance  - Assess pain using appropriate pain scale  - Administer analgesics based on type and severity of pain and evaluate response  - Implement non-pharmacological measures as appropriate and evaluate response  - Consider cultural and social influences on pain and pain management  - Manage/alleviate anxiety  - Utilize distraction and/or relaxation techniques  - Monitor for opioid side effects  - Notify MD/LIP if interventions unsuccessful or patient reports new pain  - Anticipate increased pain with activity and pre-medicate as appropriate  Outcome: Progressing     Problem: ANXIETY  Goal: Will report anxiety at manageable levels  Description: INTERVENTIONS:  - Administer medication as ordered  - Teach and rehearse alternative coping skills  - Provide emotional support with 1:1 interaction with staff  Outcome: Progressing     Problem: Patient/Family Goals  Goal: Patient/Family Long Term Goal  Description: Patient's Long Term Goal: Safe and uncomplicated delivery    Interventions:  - See additional Care Plan goals for specific interventions  Outcome: Progressing  Goal: Patient/Family Short Term Goal  Description: Patient's Short Term Goal: Adequate pain control    Interventions:   - See additional Care Plan goals for specific interventions  Outcome: Progressing

## 2023-08-27 NOTE — PROGRESS NOTES
NURSING DISCHARGE NOTE    Discharged pt via ambulation to [facility]. Accompanied by spouse. Belongings at hand. IV catheter d/c'd; catheter intact. Discharge instruction and education given to pt and verbalizes understanding. All questions and concerns addressed.

## 2023-08-27 NOTE — DISCHARGE INSTRUCTIONS
Labor Discharge Instructions    Call your OB doctor if you have any of the following signs:    Period-like cramps that may come and go  Low, dull backache  Pressure in your lower abdomen that may feel like the baby is pushing down  Change in the type or amount of vaginal discharge  Abdominal cramps that may be accompanied by diarrhea  Fluid leaking from your vagina (may or may not be bloody)  Uterine Activity Instructions:

## 2023-08-27 NOTE — NST
Nonstress Test   Patient: Hemanth Dean    Gestation: 38w4d    NST:       Variability: Moderate           Accelerations: Yes           Decelerations: None            Baseline: 135 BPM           Uterine Irritability: No           Contractions: Irregular                                        Acoustic Stimulator: No           Nonstress Test Interpretation: Reactive           Nonstress Test Second Interpretation: Reactive          FHR Category: Category I          Additional Comments

## 2023-08-28 ENCOUNTER — ANESTHESIA (OUTPATIENT)
Dept: OBGYN UNIT | Facility: HOSPITAL | Age: 23
End: 2023-08-28
Payer: COMMERCIAL

## 2023-08-28 ENCOUNTER — HOSPITAL ENCOUNTER (INPATIENT)
Facility: HOSPITAL | Age: 23
LOS: 2 days | Discharge: HOME | End: 2023-08-30
Attending: STUDENT IN AN ORGANIZED HEALTH CARE EDUCATION/TRAINING PROGRAM | Admitting: STUDENT IN AN ORGANIZED HEALTH CARE EDUCATION/TRAINING PROGRAM
Payer: COMMERCIAL

## 2023-08-28 ENCOUNTER — ANESTHESIA EVENT (OUTPATIENT)
Dept: OBGYN UNIT | Facility: HOSPITAL | Age: 23
End: 2023-08-28
Payer: COMMERCIAL

## 2023-08-28 LAB
ALBUMIN SERPL-MCNC: 3 G/DL (ref 3.4–5)
ALBUMIN/GLOB SERPL: 0.8 {RATIO} (ref 1–2)
ALP LIVER SERPL-CCNC: 173 U/L
ALT SERPL-CCNC: 22 U/L
ANION GAP SERPL CALC-SCNC: 9 MMOL/L (ref 0–18)
ANTIBODY SCREEN: NEGATIVE
AST SERPL-CCNC: 16 U/L (ref 15–37)
BASOPHILS # BLD AUTO: 0.05 X10(3) UL (ref 0–0.2)
BASOPHILS NFR BLD AUTO: 0.2 %
BILIRUB SERPL-MCNC: 0.5 MG/DL (ref 0.1–2)
BUN BLD-MCNC: 13 MG/DL (ref 7–18)
CALCIUM BLD-MCNC: 9 MG/DL (ref 8.5–10.1)
CHLORIDE SERPL-SCNC: 106 MMOL/L (ref 98–112)
CO2 SERPL-SCNC: 21 MMOL/L (ref 21–32)
CREAT BLD-MCNC: 0.82 MG/DL
EGFRCR SERPLBLD CKD-EPI 2021: 102 ML/MIN/1.73M2 (ref 60–?)
EOSINOPHIL # BLD AUTO: 0.03 X10(3) UL (ref 0–0.7)
EOSINOPHIL NFR BLD AUTO: 0.1 %
ERYTHROCYTE [DISTWIDTH] IN BLOOD BY AUTOMATED COUNT: 12.6 %
GLOBULIN PLAS-MCNC: 3.7 G/DL (ref 2.8–4.4)
GLUCOSE BLD-MCNC: 116 MG/DL (ref 70–99)
HCT VFR BLD AUTO: 36.5 %
HGB BLD-MCNC: 12.6 G/DL
IMM GRANULOCYTES # BLD AUTO: 0.16 X10(3) UL (ref 0–1)
IMM GRANULOCYTES NFR BLD: 0.6 %
LYMPHOCYTES # BLD AUTO: 2.06 X10(3) UL (ref 1–4)
LYMPHOCYTES NFR BLD AUTO: 8.2 %
MCH RBC QN AUTO: 30.5 PG (ref 26–34)
MCHC RBC AUTO-ENTMCNC: 34.5 G/DL (ref 31–37)
MCV RBC AUTO: 88.4 FL
MONOCYTES # BLD AUTO: 1.25 X10(3) UL (ref 0.1–1)
MONOCYTES NFR BLD AUTO: 5 %
NEUTROPHILS # BLD AUTO: 21.66 X10 (3) UL (ref 1.5–7.7)
NEUTROPHILS # BLD AUTO: 21.66 X10(3) UL (ref 1.5–7.7)
NEUTROPHILS NFR BLD AUTO: 85.9 %
OSMOLALITY SERPL CALC.SUM OF ELEC: 283 MOSM/KG (ref 275–295)
PLATELET # BLD AUTO: 189 10(3)UL (ref 150–450)
POTASSIUM SERPL-SCNC: 4 MMOL/L (ref 3.5–5.1)
PROT SERPL-MCNC: 6.7 G/DL (ref 6.4–8.2)
RBC # BLD AUTO: 4.13 X10(6)UL
RH BLOOD TYPE: POSITIVE
SODIUM SERPL-SCNC: 136 MMOL/L (ref 136–145)
WBC # BLD AUTO: 25.2 X10(3) UL (ref 4–11)

## 2023-08-28 PROCEDURE — 0UJD7ZZ INSPECTION OF UTERUS AND CERVIX, VIA NATURAL OR ARTIFICIAL OPENING: ICD-10-PCS | Performed by: OBSTETRICS & GYNECOLOGY

## 2023-08-28 PROCEDURE — 0HQ9XZZ REPAIR PERINEUM SKIN, EXTERNAL APPROACH: ICD-10-PCS | Performed by: OBSTETRICS & GYNECOLOGY

## 2023-08-28 PROCEDURE — 59400 OBSTETRICAL CARE: CPT | Performed by: OBSTETRICS & GYNECOLOGY

## 2023-08-28 RX ORDER — CHOLECALCIFEROL (VITAMIN D3) 25 MCG
1 TABLET,CHEWABLE ORAL DAILY
Status: DISCONTINUED | OUTPATIENT
Start: 2023-08-28 | End: 2023-08-30

## 2023-08-28 RX ORDER — IBUPROFEN 600 MG/1
600 TABLET ORAL EVERY 6 HOURS
Status: DISCONTINUED | OUTPATIENT
Start: 2023-08-28 | End: 2023-08-30

## 2023-08-28 RX ORDER — BUPIVACAINE HCL/0.9 % NACL/PF 0.25 %
5 PLASTIC BAG, INJECTION (ML) EPIDURAL AS NEEDED
Status: DISCONTINUED | OUTPATIENT
Start: 2023-08-28 | End: 2023-08-28

## 2023-08-28 RX ORDER — DOCUSATE SODIUM 100 MG/1
100 CAPSULE, LIQUID FILLED ORAL
Status: DISCONTINUED | OUTPATIENT
Start: 2023-08-28 | End: 2023-08-30

## 2023-08-28 RX ORDER — MISOPROSTOL 200 UG/1
1000 TABLET ORAL EVERY 6 HOURS SCHEDULED
Status: DISCONTINUED | OUTPATIENT
Start: 2023-08-28 | End: 2023-08-28

## 2023-08-28 RX ORDER — DEXTROSE, SODIUM CHLORIDE, SODIUM LACTATE, POTASSIUM CHLORIDE, AND CALCIUM CHLORIDE 5; .6; .31; .03; .02 G/100ML; G/100ML; G/100ML; G/100ML; G/100ML
INJECTION, SOLUTION INTRAVENOUS AS NEEDED
Status: DISCONTINUED | OUTPATIENT
Start: 2023-08-28 | End: 2023-08-28 | Stop reason: HOSPADM

## 2023-08-28 RX ORDER — IBUPROFEN 600 MG/1
600 TABLET ORAL ONCE AS NEEDED
Status: DISCONTINUED | OUTPATIENT
Start: 2023-08-28 | End: 2023-08-28 | Stop reason: HOSPADM

## 2023-08-28 RX ORDER — ACETAMINOPHEN 500 MG
1000 TABLET ORAL EVERY 6 HOURS PRN
Status: DISCONTINUED | OUTPATIENT
Start: 2023-08-28 | End: 2023-08-30

## 2023-08-28 RX ORDER — ACETAMINOPHEN 500 MG
1000 TABLET ORAL EVERY 6 HOURS PRN
Status: DISCONTINUED | OUTPATIENT
Start: 2023-08-28 | End: 2023-08-28 | Stop reason: HOSPADM

## 2023-08-28 RX ORDER — MISOPROSTOL 200 UG/1
TABLET ORAL
Status: COMPLETED
Start: 2023-08-28 | End: 2023-08-28

## 2023-08-28 RX ORDER — CITRIC ACID/SODIUM CITRATE 334-500MG
30 SOLUTION, ORAL ORAL AS NEEDED
Status: DISCONTINUED | OUTPATIENT
Start: 2023-08-28 | End: 2023-08-28 | Stop reason: HOSPADM

## 2023-08-28 RX ORDER — ACETAMINOPHEN 500 MG
500 TABLET ORAL EVERY 6 HOURS PRN
Status: DISCONTINUED | OUTPATIENT
Start: 2023-08-28 | End: 2023-08-28 | Stop reason: HOSPADM

## 2023-08-28 RX ORDER — NALBUPHINE HYDROCHLORIDE 10 MG/ML
2.5 INJECTION, SOLUTION INTRAMUSCULAR; INTRAVENOUS; SUBCUTANEOUS
Status: DISCONTINUED | OUTPATIENT
Start: 2023-08-28 | End: 2023-08-28

## 2023-08-28 RX ORDER — BISACODYL 10 MG
10 SUPPOSITORY, RECTAL RECTAL ONCE AS NEEDED
Status: DISCONTINUED | OUTPATIENT
Start: 2023-08-28 | End: 2023-08-30

## 2023-08-28 RX ORDER — ACETAMINOPHEN 500 MG
500 TABLET ORAL EVERY 6 HOURS PRN
Status: DISCONTINUED | OUTPATIENT
Start: 2023-08-28 | End: 2023-08-30

## 2023-08-28 RX ORDER — SODIUM CHLORIDE, SODIUM LACTATE, POTASSIUM CHLORIDE, CALCIUM CHLORIDE 600; 310; 30; 20 MG/100ML; MG/100ML; MG/100ML; MG/100ML
INJECTION, SOLUTION INTRAVENOUS CONTINUOUS
Status: DISCONTINUED | OUTPATIENT
Start: 2023-08-28 | End: 2023-08-28 | Stop reason: HOSPADM

## 2023-08-28 RX ORDER — CEFAZOLIN SODIUM/WATER 2 G/20 ML
2 SYRINGE (ML) INTRAVENOUS ONCE
Status: COMPLETED | OUTPATIENT
Start: 2023-08-28 | End: 2023-08-28

## 2023-08-28 RX ORDER — HYDROCODONE BITARTRATE AND ACETAMINOPHEN 5; 325 MG/1; MG/1
1 TABLET ORAL EVERY 6 HOURS PRN
Status: DISCONTINUED | OUTPATIENT
Start: 2023-08-28 | End: 2023-08-30

## 2023-08-28 RX ORDER — CEFAZOLIN SODIUM/WATER 2 G/20 ML
SYRINGE (ML) INTRAVENOUS
Status: COMPLETED
Start: 2023-08-28 | End: 2023-08-28

## 2023-08-28 RX ORDER — SIMETHICONE 80 MG
80 TABLET,CHEWABLE ORAL 3 TIMES DAILY PRN
Status: DISCONTINUED | OUTPATIENT
Start: 2023-08-28 | End: 2023-08-30

## 2023-08-28 RX ORDER — CALCIUM CARBONATE 500 MG/1
1000 TABLET, CHEWABLE ORAL 3 TIMES DAILY PRN
Status: DISCONTINUED | OUTPATIENT
Start: 2023-08-28 | End: 2023-08-28

## 2023-08-28 RX ORDER — TERBUTALINE SULFATE 1 MG/ML
0.25 INJECTION, SOLUTION SUBCUTANEOUS AS NEEDED
Status: DISCONTINUED | OUTPATIENT
Start: 2023-08-28 | End: 2023-08-28 | Stop reason: HOSPADM

## 2023-08-28 RX ORDER — ONDANSETRON 2 MG/ML
4 INJECTION INTRAMUSCULAR; INTRAVENOUS EVERY 6 HOURS PRN
Status: DISCONTINUED | OUTPATIENT
Start: 2023-08-28 | End: 2023-08-28 | Stop reason: HOSPADM

## 2023-08-29 PROBLEM — O14.93 PRE-ECLAMPSIA IN THIRD TRIMESTER: Status: ACTIVE | Noted: 2023-08-28

## 2023-08-29 PROBLEM — O13.3 GESTATIONAL HYPERTENSION, THIRD TRIMESTER: Status: ACTIVE | Noted: 2023-08-28

## 2023-08-29 PROBLEM — O13.3 GESTATIONAL HYPERTENSION, THIRD TRIMESTER: Status: ACTIVE | Noted: 2023-08-29

## 2023-08-29 LAB
CREAT UR-SCNC: 35.7 MG/DL
HCT VFR BLD AUTO: 36.7 %
HGB BLD-MCNC: 12.3 G/DL
PROT UR-MCNC: 14 MG/DL
PROT/CREAT UR-RTO: 0.39
WBC # BLD AUTO: 19.6 X10(3) UL (ref 4–11)

## 2023-08-30 VITALS
SYSTOLIC BLOOD PRESSURE: 116 MMHG | TEMPERATURE: 98 F | HEART RATE: 68 BPM | HEIGHT: 64 IN | WEIGHT: 205 LBS | BODY MASS INDEX: 35 KG/M2 | DIASTOLIC BLOOD PRESSURE: 67 MMHG | OXYGEN SATURATION: 98 % | RESPIRATION RATE: 14 BRPM

## 2023-08-30 RX ORDER — IBUPROFEN 600 MG/1
600 TABLET ORAL EVERY 6 HOURS PRN
Qty: 20 TABLET | Refills: 0 | Status: SHIPPED | OUTPATIENT
Start: 2023-08-30

## 2023-08-31 ENCOUNTER — TELEPHONE (OUTPATIENT)
Dept: OBGYN CLINIC | Facility: CLINIC | Age: 23
End: 2023-08-31

## 2023-09-01 ENCOUNTER — TELEPHONE (OUTPATIENT)
Dept: OBGYN UNIT | Facility: HOSPITAL | Age: 23
End: 2023-09-01

## 2023-09-01 ENCOUNTER — TELEPHONE (OUTPATIENT)
Dept: OBGYN CLINIC | Facility: CLINIC | Age: 23
End: 2023-09-01

## 2023-09-05 ENCOUNTER — NURSE ONLY (OUTPATIENT)
Dept: OBGYN CLINIC | Facility: CLINIC | Age: 23
End: 2023-09-05
Payer: COMMERCIAL

## 2023-09-05 ENCOUNTER — NURSE ONLY (OUTPATIENT)
Dept: LACTATION | Facility: HOSPITAL | Age: 23
End: 2023-09-05
Attending: OBSTETRICS & GYNECOLOGY
Payer: COMMERCIAL

## 2023-09-05 VITALS
HEART RATE: 68 BPM | BODY MASS INDEX: 32 KG/M2 | WEIGHT: 189.19 LBS | DIASTOLIC BLOOD PRESSURE: 71 MMHG | SYSTOLIC BLOOD PRESSURE: 115 MMHG

## 2023-09-05 DIAGNOSIS — O92.4 HYPOGALACTIA WITH INFANT BREAST ATTACHMENT DIFFICULTY: Primary | ICD-10-CM

## 2023-09-05 PROCEDURE — 99213 OFFICE O/P EST LOW 20 MIN: CPT

## 2023-09-05 RX ORDER — DOCUSATE SODIUM 100 MG/1
100 CAPSULE, LIQUID FILLED ORAL DAILY
COMMUNITY

## 2023-09-05 NOTE — PROGRESS NOTES
Patient presents for BP check status post  on 23. Gestational hypertension. Not currently taking any blood pressure medications. 6 week postpartum appointment scheduled 10/11/2023. Patient denies any symptoms of preeclampsia. Has not been monitoring BP at home- lost the cuff so will be obtaining a new one. BP in office today after patient sitting in chair and resting for 5 minutes- 115/71  Advised patient to check blood pressure twice daily and record on a log. Please call the office if you have any blood pressures greater than 140/90, have a persistent headache that is not improved with rest/fluids/food/over the counter pain medication, vision changes, pain in your right upper side, or unusual/new swelling. Patient verbalized understanding.

## 2023-09-18 ENCOUNTER — TELEPHONE (OUTPATIENT)
Dept: OBGYN CLINIC | Facility: CLINIC | Age: 23
End: 2023-09-18

## 2023-09-18 NOTE — TELEPHONE ENCOUNTER
Patient delivered 08/28/23    Patient calling and states she has received orders from the [facility] to move to [facility] permanently. She has to report on 10/09/23. PP visit moved to 10/05/23. No further questions.

## 2023-09-18 NOTE — TELEPHONE ENCOUNTER
Pt called stating she received a call from [facility] requesting her to leave October 5th to [place]. Pt would like to be seen sooner to get the clear, was not sure if I was allowed to push her appointment before October 5th.

## 2023-10-05 ENCOUNTER — POSTPARTUM (OUTPATIENT)
Dept: OBGYN CLINIC | Facility: CLINIC | Age: 23
End: 2023-10-05
Payer: COMMERCIAL

## 2023-10-05 VITALS
WEIGHT: 185 LBS | HEART RATE: 67 BPM | BODY MASS INDEX: 32 KG/M2 | SYSTOLIC BLOOD PRESSURE: 120 MMHG | DIASTOLIC BLOOD PRESSURE: 78 MMHG

## 2023-10-05 PROCEDURE — 3074F SYST BP LT 130 MM HG: CPT | Performed by: NURSE PRACTITIONER

## 2023-10-05 PROCEDURE — 3078F DIAST BP <80 MM HG: CPT | Performed by: NURSE PRACTITIONER

## (undated) NOTE — LETTER
Date & Time: 6/27/2019, 7:34 PM  Patient: Alexandro Roa  Encounter Provider(s):    Telly Slater MD       To Whom It May Concern:    Alexandro Roa was seen and treated in our department on 6/27/2019. She can return to work on 06/29/19.     If y

## (undated) NOTE — ED AVS SNAPSHOT
[facility] in [address] [place] [address] Drive,[address]    [address] [place] [address]    Phone:  [phone]    Fax:  [phone]           Nathaniel Keenan   MRN: [id number]    Department:  THE [facility] OF [facility] in [facility] END   Date of Visit:  1/15/2017 - Albuterol Sulfate  (90 BASE) MCG/ACT Aers  - azithromycin 250 MG Tabs  - Montelukast Sodium 10 MG Tabs              Discharge Instructions       Please return to the ER/clinic if symptoms worsen. Follow-up with your PCP in 24-48 hours as needed. this physician (or your personal doctor if your instructions are to return to your personal doctor) about any new or lasting problems. The primary care or specialist physician will see patients referred from the [facility].  Follow-up care is at - If you are a smoker or have smoked in the last 12 months, we encourage you to explore options for quitting.     - If you have concerns related to behavioral health issues or thoughts of harming yourself, contact [facility] a